# Patient Record
Sex: FEMALE | Race: WHITE
[De-identification: names, ages, dates, MRNs, and addresses within clinical notes are randomized per-mention and may not be internally consistent; named-entity substitution may affect disease eponyms.]

---

## 2018-01-09 ENCOUNTER — HOSPITAL ENCOUNTER (EMERGENCY)
Dept: HOSPITAL 80 - FED | Age: 33
Discharge: HOME | End: 2018-01-09
Payer: COMMERCIAL

## 2018-01-09 VITALS — RESPIRATION RATE: 18 BRPM | TEMPERATURE: 98.2 F

## 2018-01-09 VITALS — HEART RATE: 82 BPM | DIASTOLIC BLOOD PRESSURE: 54 MMHG | OXYGEN SATURATION: 95 % | SYSTOLIC BLOOD PRESSURE: 98 MMHG

## 2018-01-09 DIAGNOSIS — K52.9: Primary | ICD-10-CM

## 2018-01-09 LAB — PLATELET # BLD: 267 10^3/UL (ref 150–400)

## 2018-01-09 PROCEDURE — 3E0337Z INTRODUCTION OF ELECTROLYTIC AND WATER BALANCE SUBSTANCE INTO PERIPHERAL VEIN, PERCUTANEOUS APPROACH: ICD-10-PCS | Performed by: PHYSICIAN ASSISTANT

## 2018-01-09 NOTE — EDPHY
H & P


Time Seen by Provider: 01/09/18 10:08


HPI/ROS: 





CHIEF COMPLAINT:  Abdominal pain, nausea





HISTORY OF PRESENT ILLNESS:  32-year-old female presents to the emergency 

department with abdominal pain and feeling nauseous.  The pain began yesterday 

afternoon as more generalized pain and is now more localized in the right lower 

quadrant.  She feels nauseous although no vomiting. No urinary symptoms. No 

back pain.  No chest pain or difficulty breathing.  Last menstrual period was 3 

weeks ago and she denies pregnancy.  No fevers or chills. Normal bowel movement 

this morning.





REVIEW OF SYSTEMS:


Constitutional:  No fever, no chills.


Eyes:  No double or blurry vision.


ENT:  No sore throat.


Respiratory:  No cough, no shortness of breath.


Cardiac:  No chest pain.


Gastrointestinal:  Abdominal pain as above.  Nausea.  No vomiting or diarrhea.


Genitourinary:  No dysuria.


Musculoskeletal:  No neck or back pain.


Skin:  No rashes.


Neurological:  No headache.


Past Medical/Surgical History: 





Depression


Social History: 





 and lives in [place]


Smoking Status: Never smoked


Physical Exam: 





General Appearance:  Alert, no distress. Afebrile.


Eyes:  Pupils equal and round.  Extraocular motions are all intact.


ENT:  Mouth:  Mucous membranes moist.


Respiratory:  No wheezing, rhonchi, or rales, lungs are clear to auscultation.


Cardiovascular:  Regular rate and rhythm.


Gastrointestinal:  Tenderness with palpation in the right lower quadrant.  Mild 

rebound tenderness. She has no guarding or masses noted. No CVA tenderness 

bilaterally.


Neurological:  Alert and oriented x 3, cranial nerves II through XII grossly 

intact


Skin:  Warm and dry, no rashes.


Musculoskeletal:  Nontender to palpate along the cervical, thoracic or lumbar 

spine.  Neck is supple.


Extremities:  Full range of motion and no peripheral edema.


Psychiatric:  Patient is oriented X 3, there is no agitation.


Constitutional: 


 Initial Vital Signs











Temperature (C)  36.7 C   01/09/18 09:53


 


Heart Rate  70   01/09/18 09:53


 


Respiratory Rate  16   01/09/18 09:53


 


Blood Pressure  114/62   01/09/18 09:53


 


O2 Sat (%)  96   01/09/18 09:53








 











O2 Delivery Mode               Room Air














Allergies/Adverse Reactions: 


 





No Known Allergies Allergy (Verified 01/09/18 09:52)


 








Home Medications: 














 Medication  Instructions  Recorded


 


Bcp  01/09/18














Medical Decision Making





- Diagnostics


Imaging Results: 


 Imaging Impressions





Abdomen CT  01/09/18 11:28


Impression:


 


1. Normal CT appearance of the appendix.


2. Enteritis, with the greatest degree of involvement at the level of the mid-to

-distal ileum. Given the relatively acute onset of symptoms and the lack of any 

prior personal or family history of IBD, these findings are likely related to a 

bacterial or viral etiology; however, continued clinical correlation and follow-

up are recommended.


3. Mild mesenteric adenitis.


4. Mild hepatomegaly.


5. There is no evidence of nephrolithiasis, obstructive uropathy, or 

pyelonephritis.


 


Findings were discussed with JESSICA NOVAK PA-C at 12:28, on 1/9/2018.


 


 











Imaging: Discussed imaging studies w/ On call Radiologist


ED Course/Re-evaluation: 





32-year-old female presents to the emergency department with abdominal pain. I 

was concerned about possible acute appendicitis.  I discussed the pros and cons 

of CT imaging of abdomen and pelvis including radiation exposure the patient 

agrees with CT scan.





CT imaging of the abdomen and pelvis was ordered.  Which revealed a normal 

appendix.  There was inflammation noted to the distal ileum within hand splint 

to the small-bowel and some free fluid in the pelvis.  There is some reactive 

lymph nodes noted.





Patient initially was given 50 mcg of fentanyl IV and then after CT scan she 

was given 30 mg of IV Toradol and IV normal saline.  Patient was feeling much 

better.  She is tolerating p.o. fluids.  She is comfortable being discharged 

home.





Encouraged the patient to return if she developed worsening abdominal pain, 

fever, vomiting, or if she felt worse in any way.  Patient was comfortable with 

this plan.





The case was discussed with Dr. Gato Schrader, secondary supervising physician, 

who did not directly evaluate the patient but agrees with treatment and plan.


Differential Diagnosis: 





Including but not limited to acute appendicitis, urinary tract infection, 

pyelonephritis, kidney stone, ovarian cyst, ovarian torsion, constipation





- Data Points


Laboratory Results: 


 Laboratory Results





 01/09/18 10:16 





 01/09/18 10:16 





 











  01/09/18 01/09/18 01/09/18





  10:30 10:16 10:16


 


WBC      





    


 


RBC      





    


 


Hgb      





    


 


Hct      





    


 


MCV      





    


 


MCH      





    


 


MCHC      





    


 


RDW      





    


 


Plt Count      





    


 


MPV      





    


 


Neut % (Auto)      





    


 


Lymph % (Auto)      





    


 


Mono % (Auto)      





    


 


Eos % (Auto)      





    


 


Baso % (Auto)      





    


 


Nucleat RBC Rel Count      





    


 


Absolute Neuts (auto)      





    


 


Absolute Lymphs (auto)      





    


 


Absolute Monos (auto)      





    


 


Absolute Eos (auto)      





    


 


Absolute Basos (auto)      





    


 


Absolute Nucleated RBC      





    


 


Immature Gran %      





    


 


Immature Gran #      





    


 


Sodium      139 mEq/L mEq/L





     (134-144) 


 


Potassium      3.9 mEq/L mEq/L





     (3.5-5.2) 


 


Chloride      107 mEq/L mEq/L





     () 


 


Carbon Dioxide      20 mEq/l L mEq/l





     (22-31) 


 


Anion Gap      12 mEq/L mEq/L





     (8-16) 


 


BUN      12 mg/dL mg/dL





     (7-23) 


 


Creatinine      0.9 mg/dL mg/dL





     (0.6-1.0) 


 


Estimated GFR      > 60 





    


 


Glucose      95 mg/dL mg/dL





     () 


 


Calcium      8.7 mg/dL mg/dL





     (8.5-10.4) 


 


Beta HCG, Qual    NEGATIVE   





    


 


Urine Color  YELLOW     





    


 


Urine Appearance  CLEAR     





    


 


Urine pH  6.0     





   (5.0-7.5)   


 


Ur Specific Gravity  1.024     





   (1.002-1.030)   


 


Urine Protein  1+  H     





   (NEGATIVE)   


 


Urine Ketones  NEGATIVE     





   (NEGATIVE)   


 


Urine Blood  2+  H     





   (NEGATIVE)   


 


Urine Nitrate  NEGATIVE     





   (NEGATIVE)   


 


Urine Bilirubin  NEGATIVE     





   (NEGATIVE)   


 


Urine Urobilinogen  2.0 EU H EU    





   (0.2-1.0)   


 


Ur Leukocyte Esterase  NEGATIVE     





   (NEGATIVE)   


 


Urine RBC  15-25 /hpf H /hpf    





   (0-3)   


 


Urine WBC  NONE SEEN /hpf /hpf    





   (0-3)   


 


Ur Epithelial Cells  TRACE /lpf /lpf    





   (NONE-1+)   


 


Hyaline Casts  1-5 /lpf /lpf    





   (0-1)   


 


Urine Mucus  TRACE /lpf /lpf    





   (NONE-1+)   


 


Urine Glucose  NEGATIVE     





   (NEGATIVE)   














  01/09/18





  10:16


 


WBC  7.33 10^3/uL 10^3/uL





   (3.80-9.50) 


 


RBC  4.09 10^6/uL L 10^6/uL





   (4.18-5.33) 


 


Hgb  14.1 g/dL g/dL





   (12.6-16.3) 


 


Hct  40.6 % %





   (38.0-47.0) 


 


MCV  99.3 fL fL





   (81.5-99.8) 


 


MCH  34.5 pg H pg





   (27.9-34.1) 


 


MCHC  34.7 g/dL g/dL





   (32.4-36.7) 


 


RDW  13.0 % %





   (11.5-15.2) 


 


Plt Count  267 10^3/uL 10^3/uL





   (150-400) 


 


MPV  9.2 fL fL





   (8.7-11.7) 


 


Neut % (Auto)  78.0 % H %





   (39.3-74.2) 


 


Lymph % (Auto)  15.4 % %





   (15.0-45.0) 


 


Mono % (Auto)  5.6 % %





   (4.5-13.0) 


 


Eos % (Auto)  0.4 % L %





   (0.6-7.6) 


 


Baso % (Auto)  0.3 % %





   (0.3-1.7) 


 


Nucleat RBC Rel Count  0.0 % %





   (0.0-0.2) 


 


Absolute Neuts (auto)  5.72 10^3/uL 10^3/uL





   (1.70-6.50) 


 


Absolute Lymphs (auto)  1.13 10^3/uL 10^3/uL





   (1.00-3.00) 


 


Absolute Monos (auto)  0.41 10^3/uL 10^3/uL





   (0.30-0.80) 


 


Absolute Eos (auto)  0.03 10^3/uL 10^3/uL





   (0.03-0.40) 


 


Absolute Basos (auto)  0.02 10^3/uL 10^3/uL





   (0.02-0.10) 


 


Absolute Nucleated RBC  0.00 10^3/uL 10^3/uL





   (0-0.01) 


 


Immature Gran %  0.3 % %





   (0.0-1.1) 


 


Immature Gran #  0.02 10^3/uL 10^3/uL





   (0.00-0.10) 


 


Sodium  





  


 


Potassium  





  


 


Chloride  





  


 


Carbon Dioxide  





  


 


Anion Gap  





  


 


BUN  





  


 


Creatinine  





  


 


Estimated GFR  





  


 


Glucose  





  


 


Calcium  





  


 


Beta HCG, Qual  





  


 


Urine Color  





  


 


Urine Appearance  





  


 


Urine pH  





  


 


Ur Specific Gravity  





  


 


Urine Protein  





  


 


Urine Ketones  





  


 


Urine Blood  





  


 


Urine Nitrate  





  


 


Urine Bilirubin  





  


 


Urine Urobilinogen  





  


 


Ur Leukocyte Esterase  





  


 


Urine RBC  





  


 


Urine WBC  





  


 


Ur Epithelial Cells  





  


 


Hyaline Casts  





  


 


Urine Mucus  





  


 


Urine Glucose  





  











Medications Given: 


 








Discontinued Medications





Fentanyl (Sublimaze)  50 mcg IVP EDNOW ONE


   Stop: 01/09/18 10:49


   Last Admin: 01/09/18 10:53 Dose:  50 mcg


Sodium Chloride (Ns)  1,000 mls @ 0 mls/hr IV ONCE ONE


   PRN Reason: Wide Open


   Stop: 01/09/18 10:49


   Last Admin: 01/09/18 10:53 Dose:  1,000 mls


Sodium Chloride (Ns)  1,000 mls @ 0 mls/hr IV ONCE ONE


   PRN Reason: Wide Open


   Stop: 01/09/18 12:54


   Last Admin: 01/09/18 12:58 Dose:  1,000 mls


Ketorolac Tromethamine (Toradol)  30 mg IVP EDNOW ONE


   Stop: 01/09/18 12:54


   Last Admin: 01/09/18 12:59 Dose:  30 mg


Ondansetron HCl (Zofran)  4 mg IVP EDNOW ONE


   Stop: 01/09/18 10:49


   Last Admin: 01/09/18 10:55 Dose:  4 mg








Departure





- Departure


Disposition: Home, Routine, Self-Care


Clinical Impression: 


 Enteritis





Abdominal pain


Qualifiers:


 Abdominal location: right lower quadrant Qualified Code(s): R10.31 - Right 

lower quadrant pain





Condition: Good


Instructions:  Acute Abdominal Pain (ED), Enteritis (ED)


Additional Instructions: 


Abdominal Pain:


Return to the Emergency Department immediately for increasing pain, fever, 

vomiting, or if not completely better in 8-12 hours.





Clear liquids and then slowly advance diet as tolerated.





Referrals: 


Juan Luis Constantino MD [Medical Doctor] - As per Instructions (

Gastroenterologist on-call)


Mick Hawkins MD [Medical Doctor] - As per Instructions